# Patient Record
Sex: FEMALE | Race: WHITE | Employment: FULL TIME | URBAN - METROPOLITAN AREA
[De-identification: names, ages, dates, MRNs, and addresses within clinical notes are randomized per-mention and may not be internally consistent; named-entity substitution may affect disease eponyms.]

---

## 2017-10-12 ENCOUNTER — HOSPITAL ENCOUNTER (OUTPATIENT)
Age: 57
Setting detail: SPECIMEN
Discharge: HOME | End: 2017-10-12
Payer: COMMERCIAL

## 2017-10-16 LAB — DERMATOLOGY PATHOLOGY REPORT: NORMAL

## 2018-02-21 ENCOUNTER — OFFICE VISIT (OUTPATIENT)
Dept: FAMILY MEDICINE CLINIC | Age: 58
End: 2018-02-21
Payer: COMMERCIAL

## 2018-02-21 VITALS
HEIGHT: 69 IN | TEMPERATURE: 98.8 F | SYSTOLIC BLOOD PRESSURE: 126 MMHG | HEART RATE: 87 BPM | OXYGEN SATURATION: 96 % | DIASTOLIC BLOOD PRESSURE: 80 MMHG | WEIGHT: 172 LBS | BODY MASS INDEX: 25.48 KG/M2

## 2018-02-21 DIAGNOSIS — J06.9 VIRAL URI: Primary | ICD-10-CM

## 2018-02-21 DIAGNOSIS — R05.9 COUGH: ICD-10-CM

## 2018-02-21 LAB
INFLUENZA A ANTIBODY: NEGATIVE
INFLUENZA B ANTIBODY: NEGATIVE

## 2018-02-21 PROCEDURE — 99213 OFFICE O/P EST LOW 20 MIN: CPT | Performed by: NURSE PRACTITIONER

## 2018-02-21 PROCEDURE — 87804 INFLUENZA ASSAY W/OPTIC: CPT | Performed by: NURSE PRACTITIONER

## 2018-02-21 RX ORDER — BENZONATATE 100 MG/1
100 CAPSULE ORAL 3 TIMES DAILY PRN
Qty: 20 CAPSULE | Refills: 0 | Status: SHIPPED | OUTPATIENT
Start: 2018-02-21

## 2018-02-21 ASSESSMENT — ENCOUNTER SYMPTOMS
DIARRHEA: 0
RHINORRHEA: 1
SHORTNESS OF BREATH: 0
COUGH: 1
SORE THROAT: 0
NAUSEA: 0
VOMITING: 1

## 2018-02-21 NOTE — PROGRESS NOTES
[address] [facility] - [place] [address]  Dept: [phone]  Dept Fax: [phone]    Uriel Tim is a 56 y.o. female who presents to the urgent care today for her medical conditions/complaints as noted below. Uriel Tim is c/o of Cough (mostly dry, low-grade fever, ha - started on Sunday night)      HPI:     Started feeling ill all of a sudden on Sunday. Cough, runny nose, fever 102, body aches, chest and abd sore from cough. Threw up once, but thought it was from cough. 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 Episode onset: sunday. The problem has been waxing and waning. The cough is non-productive. Associated symptoms include a fever, headaches, postnasal drip and rhinorrhea (drip). Pertinent negatives include no sore throat or shortness of breath. Ear pain: sometimes rt ear. Associated symptoms comments: Cough hurts chest and abd. Nothing aggravates the symptoms. Treatments tried: essential oils. advil, motrin. There is no history of asthma. History reviewed. No pertinent past medical history. Current Outpatient Prescriptions   Medication Sig Dispense Refill    benzonatate (TESSALON PERLES) 100 MG capsule Take 1 capsule by mouth 3 times daily as needed for Cough 20 capsule 0     No current facility-administered medications for this visit. No Known Allergies    Subjective:      Review of Systems   Constitutional: Positive for fever. HENT: Positive for postnasal drip and rhinorrhea (drip). Negative for sore throat. Ear pain: sometimes rt ear. Respiratory: Positive for cough. Negative for shortness of breath. Gastrointestinal: Positive for vomiting (once). Negative for diarrhea and nausea. Neurological: Positive for headaches. All other systems reviewed and are negative.       Objective:

## 2018-02-21 NOTE — LETTER
[address]  [place] [address]  Phone: [phone]  Fax: [address] [place], [place]        February 21, 2018     Patient: Christopher Anthony   YOB: 1960   Date of Visit: 2/21/2018       To Whom It May Concern: It is my medical opinion that Christopher Anthony may return on 2/23/18. If you have any questions or concerns, please don't hesitate to call.     Sincerely,        Salvador Vogel, CNP

## 2018-02-21 NOTE — PATIENT INSTRUCTIONS
11.5  © 9891-3422 [employer], [employer]. Care instructions adapted under license by [facility] [place] ([facility]). If you have questions about a medical condition or this instruction, always ask your healthcare professional. [address] any warranty or liability for your use of this information. Patient Education        Viral Respiratory Infection: Care Instructions  Your Care Instructions    Viruses are very small organisms. They grow in number after they enter your body. There are many types that cause different illnesses, such as colds and the mumps. The symptoms of a viral respiratory infection often start quickly. They include a fever, sore throat, and runny nose. You may also just not feel well. Or you may not want to eat much. Most viral respiratory infections are not serious. They usually get better with time and self-care. Antibiotics are not used to treat a viral infection. That's because antibiotics will not help cure a viral illness. In some cases, antiviral medicine can help your body fight a serious viral infection. Follow-up care is a key part of your treatment and safety. Be sure to make and go to all appointments, and call your doctor if you are having problems. It's also a good idea to know your test results and keep a list of the medicines you take. How can you care for yourself at home? · Rest as much as possible until you feel better. · Be safe with medicines. Take your medicine exactly as prescribed. Call your doctor if you think you are having a problem with your medicine. You will get more details on the specific medicine your doctor prescribes. · Take an over-the-counter pain medicine, such as acetaminophen (Tylenol), ibuprofen (Advil, Motrin), or naproxen (Aleve), as needed for pain and fever. Read and follow all instructions on the label. Do not give aspirin to anyone younger than 20. It has been linked to Reye syndrome, a serious illness.   · Drink plenty of fluids,

## 2018-07-26 ENCOUNTER — HOSPITAL ENCOUNTER (OUTPATIENT)
Age: 58
Setting detail: SPECIMEN
Discharge: HOME | End: 2018-07-26
Payer: COMMERCIAL

## 2018-07-30 LAB — DERMATOLOGY PATHOLOGY REPORT: NORMAL
